# Patient Record
(demographics unavailable — no encounter records)

---

## 2025-04-16 NOTE — PHYSICAL EXAM
[de-identified] :  No acute distress, AOx3 [de-identified] :  EOMI, visual acuity intact, no diplopia [de-identified] : Non labored breathing, no acute distress or SOB noted [de-identified] : .3 x .5 cm skin lesion right upper arm .1x .3 dark skin lesion under left eye

## 2025-04-16 NOTE — HISTORY OF PRESENT ILLNESS
[FreeTextEntry1] : Hayley Lucio reported that she initially went to Dr. Carlson for a lesion under her arm and on her nose, both of which were removed. However, the lesion under her arm grew back, this time appearing darker. She also mentioned a skin lesion on her face causing concern. Additionally, she noticed an itchy rash under both her arms, suspected to be irritation from a new electric razor she began using, or from potential exposure from a recent trip.

## 2025-04-16 NOTE — ASSESSMENT
[FreeTextEntry1] : Recurrent Underarm Lesion : Assessment indicates it is likely a cyst that gets infected. Plan includes surgically excising the lesion under the arm in-office with local anesthesia. Patient to decide and communicate her decision. - Facial Lesion : Assessment of the small lesion on the face suggests it may be a simple cyst. Due to the associated cosmetic concern, the risk of leaving a scar from surgical removal was communicated to the patient. If the patient opts for removal, it will be done at the hospital under sedation. Patient to think about it and inform the decision. - Underarm Rash : Assessment indicates that the rash under both arms is likely to be irritation from electric razor use. Suggested plan includes keeping the area moisturized using Aquafor and allowing hair to grow before using the electric razor.  -Procedure  was explained in great detail. Risk and benefits were explained to the patient. Risks including bleeding, infection, wound breakdown, injury to adjacent structures revision surgery. Pt understands risk and would like to proceed with surgery. - pt will proceed with schedule office procedure 30 min  block

## 2025-05-22 NOTE — PROCEDURE
[FreeTextEntry1] : right upper arm soft tissue  mass [FreeTextEntry2] : excision of right upper arm soft tissue mass [FreeTextEntry3] : local w epi  [FreeTextEntry4] : 1 [FreeTextEntry5] : none  [FreeTextEntry6] : Patient was prepped and draped in sterile fashion.  10 mL of lidocaine with epinephrine was injected to the area.  An ellipse incision was marked over the soft tissue mass.  Using a 15 blade it was incised.  Using blunt dissection the cyst was enucleated off the underlying soft tissues subcutaneous tissue.  Was sent to pathology and measured 0.8 cm.  There was then irrigated hemostasis was achieved.  The dermis was then approximated with  4-0 Monocryl suture and multiple 4-0 Prolene sutures used to close the skin patient tolerated procedure well sent the cyst to pathology.

## 2025-06-06 NOTE — DATA REVIEWED
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 - The epidermis appearing unremarkable.  Verified by: Nanette Nowak M.D. (Electronic Signature) Reported on: 05/27/25 14:58 EDT, [facility], [address] [address] Phone: [phone]   Fax: [phone] _________________________________________________________________

## 2025-06-06 NOTE — ASSESSMENT
[FreeTextEntry1] : Recurrent Underarm Lesion : Assessment indicates it is likely a cyst that gets infected.   Now here POD #15 s/p right upper arm cyst excision, doing well.  - sutures removed, daily aquaphor  - ok to start scar creams in approx 1 week.  - ok to get wet, no submerging x 1 more week  - SPF / coverage with sun exposure  - pathology discussed- benign dermatofibroma.  - all questions answered and instructions reviewed  - follow up PRN

## 2025-06-06 NOTE — HISTORY OF PRESENT ILLNESS
[FreeTextEntry1] : Hayley Lucio reported that she initially went to Dr. Carlson for a lesion under her arm and on her nose, both of which were removed. However, the lesion under her arm grew back, this time appearing darker. She also mentioned a skin lesion on her face causing concern. Additionally, she noticed an itchy rash under both her arms, suspected to be irritation from a new electric razor she began using, or from potential exposure from a recent trip.  Interval hx 6/6/25: Pt here POD#15 s/p right upper arm cyst excision.  doing well, no concerns or complaints.  applying bacitracin daily.  Denies f/c/bleeding.

## 2025-06-06 NOTE — PHYSICAL EXAM
[de-identified] :  EOMI, visual acuity intact, no diplopia [de-identified] :  No acute distress, AOx3 [de-identified] : Non labored breathing, no acute distress or SOB noted [de-identified] : .1x .3 dark skin lesion under left eye   right upper arm: incision healing well with sutures in place, c/d/i; no erythema, open wounds or drainage.